# Patient Record
Sex: FEMALE | Race: WHITE | NOT HISPANIC OR LATINO | Employment: OTHER | URBAN - METROPOLITAN AREA
[De-identification: names, ages, dates, MRNs, and addresses within clinical notes are randomized per-mention and may not be internally consistent; named-entity substitution may affect disease eponyms.]

---

## 2023-08-21 ENCOUNTER — ANESTHESIA EVENT (OUTPATIENT)
Dept: ENDOSCOPY | Facility: HOSPITAL | Age: 78
End: 2023-08-21
Payer: MEDICARE

## 2023-08-21 ENCOUNTER — HOSPITAL ENCOUNTER (OUTPATIENT)
Facility: HOSPITAL | Age: 78
Discharge: HOME | End: 2023-08-21
Attending: INTERNAL MEDICINE | Admitting: INTERNAL MEDICINE
Payer: MEDICARE

## 2023-08-21 ENCOUNTER — ANESTHESIA (OUTPATIENT)
Dept: ENDOSCOPY | Facility: HOSPITAL | Age: 78
End: 2023-08-21
Payer: MEDICARE

## 2023-08-21 VITALS
TEMPERATURE: 98 F | DIASTOLIC BLOOD PRESSURE: 68 MMHG | BODY MASS INDEX: 27.66 KG/M2 | HEART RATE: 53 BPM | RESPIRATION RATE: 17 BRPM | WEIGHT: 166 LBS | SYSTOLIC BLOOD PRESSURE: 152 MMHG | OXYGEN SATURATION: 97 % | HEIGHT: 65 IN

## 2023-08-21 DIAGNOSIS — R13.10 DYSPHAGIA, UNSPECIFIED TYPE: ICD-10-CM

## 2023-08-21 DIAGNOSIS — K22.10 EROSIVE ESOPHAGITIS: ICD-10-CM

## 2023-08-21 DIAGNOSIS — R11.2 NAUSEA AND VOMITING, UNSPECIFIED VOMITING TYPE: ICD-10-CM

## 2023-08-21 PROBLEM — K22.70 BARRETT'S ESOPHAGUS: Status: ACTIVE | Noted: 2023-08-21

## 2023-08-21 PROCEDURE — 88312 SPECIAL STAINS GROUP 1: CPT

## 2023-08-21 PROCEDURE — D9220A PRA ANESTHESIA: Mod: CRNA,,,

## 2023-08-21 PROCEDURE — 37000009 HC ANESTHESIA EA ADD 15 MINS: Performed by: INTERNAL MEDICINE

## 2023-08-21 PROCEDURE — 88313 SPECIAL STAINS GROUP 2: CPT

## 2023-08-21 PROCEDURE — 25000003 PHARM REV CODE 250

## 2023-08-21 PROCEDURE — 37000008 HC ANESTHESIA 1ST 15 MINUTES: Performed by: INTERNAL MEDICINE

## 2023-08-21 PROCEDURE — D9220A PRA ANESTHESIA: ICD-10-PCS | Mod: ANES,,, | Performed by: ANESTHESIOLOGY

## 2023-08-21 PROCEDURE — 25000003 PHARM REV CODE 250: Performed by: INTERNAL MEDICINE

## 2023-08-21 PROCEDURE — 88305 TISSUE EXAM BY PATHOLOGIST: CPT | Performed by: INTERNAL MEDICINE

## 2023-08-21 PROCEDURE — 63600175 PHARM REV CODE 636 W HCPCS

## 2023-08-21 PROCEDURE — D9220A PRA ANESTHESIA: Mod: ANES,,, | Performed by: ANESTHESIOLOGY

## 2023-08-21 PROCEDURE — 27201423 OPTIME MED/SURG SUP & DEVICES STERILE SUPPLY: Performed by: INTERNAL MEDICINE

## 2023-08-21 PROCEDURE — D9220A PRA ANESTHESIA: ICD-10-PCS | Mod: CRNA,,,

## 2023-08-21 PROCEDURE — 43239 EGD BIOPSY SINGLE/MULTIPLE: CPT | Performed by: INTERNAL MEDICINE

## 2023-08-21 RX ORDER — AMLODIPINE BESYLATE 10 MG/1
10 TABLET ORAL DAILY
COMMUNITY

## 2023-08-21 RX ORDER — ATORVASTATIN CALCIUM 20 MG/1
20 TABLET, FILM COATED ORAL DAILY
COMMUNITY

## 2023-08-21 RX ORDER — METOPROLOL SUCCINATE 50 MG/1
50 TABLET, EXTENDED RELEASE ORAL DAILY
COMMUNITY

## 2023-08-21 RX ORDER — CLOPIDOGREL BISULFATE 75 MG/1
75 TABLET ORAL DAILY
COMMUNITY

## 2023-08-21 RX ORDER — LIDOCAINE HYDROCHLORIDE 20 MG/ML
INJECTION INTRAVENOUS
Status: DISCONTINUED | OUTPATIENT
Start: 2023-08-21 | End: 2023-08-21

## 2023-08-21 RX ORDER — SODIUM CHLORIDE, SODIUM GLUCONATE, SODIUM ACETATE, POTASSIUM CHLORIDE AND MAGNESIUM CHLORIDE 30; 37; 368; 526; 502 MG/100ML; MG/100ML; MG/100ML; MG/100ML; MG/100ML
INJECTION, SOLUTION INTRAVENOUS CONTINUOUS
Status: CANCELLED | OUTPATIENT
Start: 2023-08-21 | End: 2023-09-20

## 2023-08-21 RX ORDER — AMOXICILLIN 250 MG
1 CAPSULE ORAL DAILY
COMMUNITY

## 2023-08-21 RX ORDER — PROPOFOL 10 MG/ML
VIAL (ML) INTRAVENOUS
Status: DISCONTINUED | OUTPATIENT
Start: 2023-08-21 | End: 2023-08-21

## 2023-08-21 RX ORDER — ONDANSETRON 2 MG/ML
4 INJECTION INTRAMUSCULAR; INTRAVENOUS ONCE AS NEEDED
Status: CANCELLED | OUTPATIENT
Start: 2023-08-21 | End: 2035-01-17

## 2023-08-21 RX ORDER — OMEPRAZOLE 20 MG/1
20 CAPSULE, DELAYED RELEASE ORAL DAILY
COMMUNITY

## 2023-08-21 RX ORDER — SODIUM CHLORIDE, SODIUM GLUCONATE, SODIUM ACETATE, POTASSIUM CHLORIDE AND MAGNESIUM CHLORIDE 30; 37; 368; 526; 502 MG/100ML; MG/100ML; MG/100ML; MG/100ML; MG/100ML
INJECTION, SOLUTION INTRAVENOUS CONTINUOUS
Status: DISCONTINUED | OUTPATIENT
Start: 2023-08-21 | End: 2023-08-21 | Stop reason: HOSPADM

## 2023-08-21 RX ORDER — LORATADINE 10 MG/1
10 TABLET ORAL DAILY
COMMUNITY

## 2023-08-21 RX ORDER — PROPAFENONE HYDROCHLORIDE 325 MG/1
325 CAPSULE, EXTENDED RELEASE ORAL EVERY 12 HOURS
COMMUNITY

## 2023-08-21 RX ORDER — TERAZOSIN 5 MG/1
5 CAPSULE ORAL NIGHTLY
COMMUNITY

## 2023-08-21 RX ORDER — FERROUS GLUCONATE 324(38)MG
324 TABLET ORAL
COMMUNITY

## 2023-08-21 RX ADMIN — PROPOFOL 20 MG: 10 INJECTION, EMULSION INTRAVENOUS at 12:08

## 2023-08-21 RX ADMIN — LIDOCAINE HYDROCHLORIDE 80 MG: 20 INJECTION INTRAVENOUS at 12:08

## 2023-08-21 RX ADMIN — SODIUM CHLORIDE, SODIUM GLUCONATE, SODIUM ACETATE, POTASSIUM CHLORIDE AND MAGNESIUM CHLORIDE: 526; 502; 368; 37; 30 INJECTION, SOLUTION INTRAVENOUS at 12:08

## 2023-08-21 RX ADMIN — SODIUM CHLORIDE, SODIUM GLUCONATE, SODIUM ACETATE, POTASSIUM CHLORIDE AND MAGNESIUM CHLORIDE: 526; 502; 368; 37; 30 INJECTION, SOLUTION INTRAVENOUS at 11:08

## 2023-08-21 RX ADMIN — PROPOFOL 60 MG: 10 INJECTION, EMULSION INTRAVENOUS at 12:08

## 2023-08-21 NOTE — ANESTHESIA PREPROCEDURE EVALUATION
"                                                                                                             2023  Maryjo Israel is a 78 y.o., female.  Pre-operative evaluation for Procedure(s) (LRB):  1100 EGD (N/A)    Maryjo Israel is a 78 y.o. female     There is no problem list on file for this patient.      Review of patient's allergies indicates:   Allergen Reactions    Lortab [hydrocodone-acetaminophen]     Vytorin 10-10 [ezetimibe-simvastatin]        No current facility-administered medications on file prior to encounter.     Current Outpatient Medications on File Prior to Encounter   Medication Sig Dispense Refill    amLODIPine (NORVASC) 10 MG tablet Take 10 mg by mouth once daily.      atorvastatin (LIPITOR) 20 MG tablet Take 20 mg by mouth once daily.      ferrous gluconate (FERGON) 324 MG tablet Take 324 mg by mouth daily with breakfast.      loratadine (CLARITIN) 10 mg tablet Take 10 mg by mouth once daily.      metoprolol succinate (TOPROL-XL) 50 MG 24 hr tablet Take 50 mg by mouth once daily.      omeprazole (PRILOSEC) 20 MG capsule Take 20 mg by mouth once daily.      propafenone (RYTHMOL SR) 325 MG Cp12 Take 325 mg by mouth every 12 (twelve) hours.      senna-docusate 8.6-50 mg (PERICOLACE) 8.6-50 mg per tablet Take 1 tablet by mouth once daily.      terazosin (HYTRIN) 5 MG capsule Take 5 mg by mouth every evening.      clopidogreL (PLAVIX) 75 mg tablet Take 75 mg by mouth once daily.         Past Surgical History:   Procedure Laterality Date    CARDIAC SURGERY       SECTION      COLON SURGERY      HEMORRHOID SURGERY      HYSTERECTOMY      TONSILLECTOMY         Social History     Socioeconomic History    Marital status:          CBC: No results for input(s): "WBC", "RBC", "HGB", "HCT", "PLT", "MCV", "MCH", "MCHC" in the last 72 hours.      BMP 2023 eGFR sl low=55. Cr=1.03, K=3.5  LFT: WNL.  CMP: No results for input(s): "NA", "K", "CL", "CO2", "BUN", " ""CREATININE", "GLU", "MG", "PHOS", "CALCIUM", "ALBUMIN", "PROT", "ALKPHOS", "ALT", "AST", "BILITOT" in the last 72 hours.    INR  No results for input(s): "PT", "INR", "PROTIME", "APTT" in the last 72 hours.        Diagnostic Studies:  CXR 2/14/2023 : [facility]    EKG :    [facility] 9/29/2022 for severe AS (0.71 cm2) : Patent stent LAD and obtuse marginal branch. EF=55%. Patent mLAD stent and large obtuse marginal branch. Small ramus intermedius artery with diffuse dz.   RCA: Total occlusion at the ostium with left-to-right   collateral filling the PDA      2D Echo 1/27/2023 : N Syst Fxn. EF=55-65%. S/p TAVR 1/26/2023, no leak. No pericard effu. Trace MR/PI. Mild TR.  No PHTN.     Pre-op Assessment    I have reviewed the Patient Summary Reports.     I have reviewed the Nursing Notes. I have reviewed the NPO Status.   I have reviewed the Medications.     Review of Systems  Anesthesia Hx:  No problems with previous Anesthesia  History of prior surgery of interest to airway management or planning: Previous anesthesia: General TAVR 1/26/2023: ; Hysterectomy: ; Tonsillect: ; Colon Surgery:  with general anesthesia.  Airway issues documented on chart review include GETA  Denies Family Hx of Anesthesia complications.   Denies Personal Hx of Anesthesia complications.   Social:  Non-Smoker, No Alcohol Use    Hematology/Oncology:  Hematology Normal        EENT/Dental:EENT/Dental Normal   Cardiovascular:   Valvular problems/Murmurs CAD  CABG/stent  hyperlipidemia 6/17/2021NSTEMI. Successful S/p 7/2021  PTCA and drug-eluting stent placement in proximal LAD lesion   PTCA and drug-eluting stent placement in proximal obtuse marginal branch   Lesion.  [facility] 9/29/2022 for severe AS (0.71 cm2) : Patent stent LAD and obtuse marginal branch. EF=55%. Patent mLAD stent and large obtuse marginal branch. Small ramus intermedius artery with diffuse dz.   RCA: Total occlusion at the ostium with left-to-right   collateral filling the PDA  "     2D Echo 1/27/2023 : N Syst Fxn. EF=55-65%. S/p TAVR 1/26/2023, no leak. No pericard effu. Trace MR/PI. Mild TR.  No PHTN.  Coronary Artery Disease:  hx of myocardial infarction (MI), patient hx of diagnosis, patient problem list, hx of Percutaneous Coronary Intervention (PCI), Coronary Angiography (LHC). S/P Percutaneous Coronary Intervention (PCI)   S/P Drug Eluting Stent (KESHA), obtuse marginal, left anterior descending Hx of Myocardial Infarction, MI was > 1 year ago, NSTEMI  Valvular Heart Disease: Tricuspid Regurgitation (TR), mild     Pulmonary:  Pulmonary Normal    Renal/:   Chronic Renal Disease, CKD    Hepatic/GI:   PUD,    Musculoskeletal:  Musculoskeletal Normal    Neurological:  Neurology Normal Denies TIA.  Denies CVA.    Endocrine:  Endocrine Normal    Dermatological:  Skin Normal    Psych:  Psychiatric Normal           Physical Exam  General: Cooperative, Alert and Oriented    Airway:  Mallampati: I / I  Mouth Opening: Normal  TM Distance: Normal  Tongue: Normal  Neck ROM: Normal ROM    Dental:  Intact  Px denies any loose teeth.  Chest/Lungs:  Normal Respiratory Rate    Heart:  Rate: Normal  Rhythm: Regular Rhythm    Abdomen:  Normal, Soft        Anesthesia Plan  Type of Anesthesia, risks & benefits discussed:    Anesthesia Type: Gen Natural Airway  Intra-op Monitoring Plan: Standard ASA Monitors  Induction:  IV  Informed Consent: Informed consent signed with the Patient and all parties understand the risks and agree with anesthesia plan.  All questions answered.   ASA Score: 3  Day of Surgery Review of History & Physical: H&P Update referred to the surgeon/provider.I have interviewed and examined the patient. I have reviewed the patient's H&P dated:   Anesthesia Plan Notes: TIVA with mask/NC, GA back-up.     Ready For Surgery From Anesthesia Perspective.     .

## 2023-08-21 NOTE — OP NOTE
EGD Report    Referring Physician:  Sue Bains    Date of procedure: 08/21/2023     Surgeon: Barry Barnes    ASA:  3    Medications: Per anesthesia    Indication:  Iron-deficiency anemia, pyrosis, epigastric pain    Procedure: EGD biopsy    Description of the Procedure: The patient was brought back to the endoscopy suite where the risks, benefits, and alternatives of the procedure were described in detail. The patient was given the opportunity to ask questions and then signed informed consent. Patient was positioned in the left lateral decubitus position, continuous monitoring was initiated, and supplemental oxygen was provided via nasal cannula. Bite block was placed. Adequate sedation was achieved with the above mentioned medications as documented in chart and then titrated during the entire procedure. Under direct visualization the gastroscope was introduced through the oropharynx into the esophagus. The scope was advanced into the stomach and to the second portion of the duodenum. Scope was withdrawn and the mucosa was carefully examined. The entire gastric mucosa was examined, including the fundus with retroflexion. Air was evacuated from the stomach and the scope was withdrawn into the esophagus. The entire esophageal mucosa was examined. The procedure was completed. The patient tolerated the procedure well and was transferred to the recovery area in stable condition.     Estimated Blood Loss: minimal    Complications: none    Findings:  Short-segment Clifford's evident, viewed with normal and narrow band imaging, multiple biopsied to rule out dysplasia  Small hiatal hernia  Some antral erythema, biopsy obtained to rule out Helicobacter no active ulceration  Normal duodenal in the 1st and 2nd portions    Impression and Recommendations:   The patient does have a short-segment Clifford's, but had no ulceration evident on today's exam.  She had a small hiatal hernia.  We did obtain antral biopsies to rule out  Helicobacter, and Clifford's biopsies to rule out dysplasia.  She probably ought to have her colon evaluated given the years since her last exam and her personal and family history.  We will arrange such, anticipating that she can hold her Plavix and Eliquis again for those planned procedures    Barry Barnes

## 2023-08-21 NOTE — H&P
History and Physical           HPI:     Patient is a 78 y.o. female known to me from previous evaluation.  She had colonoscopies by other physicians in the past.  Her brother was diagnosed with rectal cancer in his 60s.  I believe I last performed a screening exam on her in , and she did have polyps addressed.      She does have a history of chronic pyrosis with short-segment Clifford's above a moderately sized hiatal hernia.  She is been dilated periodically.  Her last upper scope I believe took place in 2017, short-segment Clifford's, large hiatal hernia, grade C esophagitis with an ulcer at the GE junction.      She had been on a proton pump inhibitor when last seen.      She was apparently hospitalized with a urinary tract infection and found to have anemia.  She does have aortic stenosis and atrial fibrillation she had a TAVR, and during her hospitalization she was found to have fairly significant anemia.  She tells us that she has been troubled of late by progressive pyrosis and epigastric tenderness, though she has found some benefit with over-the-counter omeprazole.      Given that scenario we had proposed an upper exam, and she is today scheduled for that.        PCP:  Sue Bains NP    Review of patient's allergies indicates:   Allergen Reactions    Lortab [hydrocodone-acetaminophen]     Vytorin 10-10 [ezetimibe-simvastatin]         Past Medical History:  Past Medical History:   Diagnosis Date    Dysphagia     Erosive esophagitis     Nausea and vomiting       Past Surgical History:  Past Surgical History:   Procedure Laterality Date    CARDIAC SURGERY       SECTION      COLON SURGERY      HEMORRHOID SURGERY      HYSTERECTOMY      TONSILLECTOMY        Family History:  History reviewed. No pertinent family history.  Social History:  Social History     Tobacco Use    Smoking status: Not on file    Smokeless tobacco: Not on file   Substance Use Topics    Alcohol use: Not on file          Review of Systems:     Review of Systems    Objective:     VITAL SIGNS: 24 HR MIN & MAX LAST    Temp  Min: 97.9 °F (36.6 °C)  Max: 97.9 °F (36.6 °C)  97.9 °F (36.6 °C)        BP  Min: 115/71  Max: 146/70  115/71     Pulse  Min: 54  Max: 56  (!) 56     Resp  Min: 19  Max: 21  19    SpO2  Min: 97 %  Max: 98 %  97 %      Physical Exam  Constitutional:       Appearance: Normal appearance.   HENT:      Head: Atraumatic.      Mouth/Throat:      Mouth: Mucous membranes are moist.   Eyes:      Extraocular Movements: Extraocular movements intact.      Pupils: Pupils are equal, round, and reactive to light.   Cardiovascular:      Rate and Rhythm: Normal rate and regular rhythm.      Heart sounds: Normal heart sounds.   Pulmonary:      Breath sounds: Normal breath sounds.   Abdominal:      Palpations: Abdomen is soft.      Comments: Mild epigastric tenderness   Musculoskeletal:      Right lower leg: No edema.      Left lower leg: No edema.   Skin:     General: Skin is warm and dry.   Neurological:      General: No focal deficit present.      Mental Status: She is alert and oriented to person, place, and time.   Psychiatric:         Mood and Affect: Mood normal.         Behavior: Behavior normal.           No results found for this or any previous visit (from the past 48 hour(s)).    No results found.    @[facility]@    Assessment /Plan:   Complex 78-year-old found to have significant anemia on Plavix, admits to pyrosis and epigastric pain, has a history of short-segment Clifford's with no endoscopy in recent years.  Also a history of adenomatous colon polyps and a family history of colon cancer.  She is today scheduled for upper exam, and we will determine whether she ought to have her lower tract evaluated down the line  There are no problems to display for this patient.       Thank you for allowing us to participate in this patient's care.

## 2023-08-21 NOTE — DISCHARGE SUMMARY
[facility]  Discharge Note  Short Stay    Procedure(s) (LRB):  EGD (N/A)      OUTCOME: Patient tolerated treatment/procedure well without complication and is now ready for discharge.    DISPOSITION: Home or Self Care    FINAL DIAGNOSIS:  <principal problem not specified>    FOLLOWUP: With primary care provider    DISCHARGE INSTRUCTIONS:  No discharge procedures on file.      Clinical Reference Documents Added to Patient Instructions         Document    MODERATE SEDATION IN ADULTS DISCHARGE INSTRUCTIONS (ENGLISH)    UPPER GI ENDOSCOPY DISCHARGE INSTRUCTIONS (ENGLISH)            TIME SPENT ON DISCHARGE: 15 minutes

## 2023-08-21 NOTE — TRANSFER OF CARE
"Anesthesia Transfer of Care Note    Patient: Maryjo Israel    Procedure(s) Performed: Procedure(s) (LRB):  EGD (N/A)    Patient location: GI    Anesthesia Type: general    Transport from OR: Transported from OR on room air with adequate spontaneous ventilation    Post pain: adequate analgesia    Post assessment: no apparent anesthetic complications and tolerated procedure well    Post vital signs: stable    Level of consciousness: awake, alert and oriented    Complications: none    Transfer of care protocol was followed      Last vitals:   Visit Vitals  /71   Pulse (!) 56   Temp 36.6 °C (97.9 °F) (Skin)   Resp 19   Ht 5' 5" (1.651 m)   Wt 75.3 kg (166 lb)   SpO2 97%   BMI 27.62 kg/m²     "

## 2023-08-21 NOTE — ANESTHESIA POSTPROCEDURE EVALUATION
Anesthesia Post Evaluation    Patient: Maryjo Israel    Procedure(s) Performed: Procedure(s) (LRB):  EGD (N/A)    Final Anesthesia Type: general (-TIVA w Shira AW)      Patient location during evaluation: GI PACU  Patient participation: Yes- Able to Participate  Level of consciousness: awake and alert, awake and oriented  Post-procedure vital signs: reviewed and stable  Pain management: adequate  Airway patency: patent    PONV status at discharge: No PONV  Anesthetic complications: no      Cardiovascular status: blood pressure returned to baseline, hemodynamically stable and stable  Respiratory status: unassisted, spontaneous ventilation and room air  Hydration status: euvolemic  Follow-up not needed.          Vitals Value Taken Time   /68 08/21/23 1312   Temp 36.9 °C (98.4 °F) 08/21/23 1249   Pulse 53 08/21/23 1312   Resp 17 08/21/23 1312   SpO2 97 % 08/21/23 1312         No case tracking events are documented in the log.      Pain/Sunshine Score: Sunshine Score: 10 (8/21/2023  1:12 PM)

## 2023-08-22 LAB — PSYCHE PATHOLOGY RESULT: NORMAL

## 2023-10-23 ENCOUNTER — ANESTHESIA EVENT (OUTPATIENT)
Dept: ENDOSCOPY | Facility: HOSPITAL | Age: 78
End: 2023-10-23
Payer: MEDICARE

## 2023-10-24 ENCOUNTER — HOSPITAL ENCOUNTER (OUTPATIENT)
Facility: HOSPITAL | Age: 78
Discharge: HOME | End: 2023-10-24
Attending: INTERNAL MEDICINE | Admitting: INTERNAL MEDICINE
Payer: MEDICARE

## 2023-10-24 ENCOUNTER — ANESTHESIA (OUTPATIENT)
Dept: ENDOSCOPY | Facility: HOSPITAL | Age: 78
End: 2023-10-24
Payer: MEDICARE

## 2023-10-24 DIAGNOSIS — Z86.010 PERSONAL HISTORY OF COLONIC POLYPS: ICD-10-CM

## 2023-10-24 DIAGNOSIS — K63.5 POLYP OF HEPATIC FLEXURE OF COLON: Primary | ICD-10-CM

## 2023-10-24 PROBLEM — Z86.0100 PERSONAL HISTORY OF COLONIC POLYPS: Status: ACTIVE | Noted: 2023-10-24

## 2023-10-24 PROCEDURE — 37000009 HC ANESTHESIA EA ADD 15 MINS: Performed by: INTERNAL MEDICINE

## 2023-10-24 PROCEDURE — D9220A PRA ANESTHESIA: ICD-10-PCS | Mod: ANES,,, | Performed by: ANESTHESIOLOGY

## 2023-10-24 PROCEDURE — 45380 COLONOSCOPY AND BIOPSY: CPT | Mod: PT | Performed by: INTERNAL MEDICINE

## 2023-10-24 PROCEDURE — 27201423 OPTIME MED/SURG SUP & DEVICES STERILE SUPPLY: Performed by: INTERNAL MEDICINE

## 2023-10-24 PROCEDURE — 63600175 PHARM REV CODE 636 W HCPCS: Performed by: ANESTHESIOLOGY

## 2023-10-24 PROCEDURE — 88305 TISSUE EXAM BY PATHOLOGIST: CPT | Performed by: INTERNAL MEDICINE

## 2023-10-24 PROCEDURE — D9220A PRA ANESTHESIA: ICD-10-PCS | Mod: CRNA,,, | Performed by: NURSE ANESTHETIST, CERTIFIED REGISTERED

## 2023-10-24 PROCEDURE — 37000008 HC ANESTHESIA 1ST 15 MINUTES: Performed by: INTERNAL MEDICINE

## 2023-10-24 PROCEDURE — D9220A PRA ANESTHESIA: Mod: CRNA,,, | Performed by: NURSE ANESTHETIST, CERTIFIED REGISTERED

## 2023-10-24 PROCEDURE — 25000003 PHARM REV CODE 250: Performed by: ANESTHESIOLOGY

## 2023-10-24 PROCEDURE — D9220A PRA ANESTHESIA: Mod: ANES,,, | Performed by: ANESTHESIOLOGY

## 2023-10-24 RX ORDER — PROPOFOL 10 MG/ML
VIAL (ML) INTRAVENOUS
Status: DISCONTINUED | OUTPATIENT
Start: 2023-10-24 | End: 2023-10-24

## 2023-10-24 RX ORDER — PROPOFOL 10 MG/ML
VIAL (ML) INTRAVENOUS
Status: COMPLETED
Start: 2023-10-24 | End: 2023-10-24

## 2023-10-24 RX ADMIN — SODIUM CHLORIDE, SODIUM GLUCONATE, SODIUM ACETATE, POTASSIUM CHLORIDE AND MAGNESIUM CHLORIDE: 526; 502; 368; 37; 30 INJECTION, SOLUTION INTRAVENOUS at 08:10

## 2023-10-24 RX ADMIN — PROPOFOL 70 MG: 10 INJECTION, EMULSION INTRAVENOUS at 08:10

## 2023-10-24 RX ADMIN — PROPOFOL 30 MG: 10 INJECTION, EMULSION INTRAVENOUS at 08:10

## 2023-10-24 RX ADMIN — PROPOFOL 30 MG: 10 INJECTION, EMULSION INTRAVENOUS at 09:10

## 2023-10-24 NOTE — OP NOTE
Colonoscopy Procedure Note    Date of procedure: 10/24/2023     Surgeon: Barry Barnes    Referring MD:Sue Bains NP      Procedure: Colonoscopy    Indications:  Iron-deficiency anemia, family history of rectal cancer       Post op Diagnosis:  Fair prep, successful examination to the terminal ileum.  Single diminutive polyp in the region of the hepatic flexure addressed with the biopsy forceps.  Reasonable mucosal visualization throughout         Sedation: Per anesthesia:       Procedure Details: The patient was brought to the endoscopy suite after the risks, benefits, and alternatives of the procedure were described and the patient was given the opportunity to ask questions and signed informed consent. Patient was positioned in the left lateral decubitus position, continuous monitoring was initiated, and supplemental oxygen was provided via nasal cannula. Adequate sedation was achieved with the medications documented in chart and titrated during the procedure. A digital rectal exam was performed. Under direct visualization the colonoscope was introduced into the rectum and advanced to the cecum. The ileocecal valve and appendiceal orifice were identified. The terminal ileum was intubated. The scope was withdrawn, and the mucosa was examined in a circular fashion. Retroflexion was done in the rectum. Air was evacuated from the colon and the procedure was completed. The patient tolerated the procedure well and was transferred to the recovery area in stable condition.     Findings:  The patient had fair preparation, and retained liquid stool prompted tedious washing and suctioning.  We saw through to the cecum and into a normal terminal ileum.  Mucosal visualization was adequate.  One single diminutive polyp in the region of the hepatic flexure measuring 3 or 4 mm with a addressed with a cold forceps.    Impression and Recommendations:   Successful endoscopy to the terminal ileum.  Diminutive polyp addressed.  Fair  prep, but reasonable visualization.  We will reassure her and leave well enough alone.  Given her years this will serve as her last routine exam       Barry Barnes MD

## 2023-10-24 NOTE — ANESTHESIA POSTPROCEDURE EVALUATION
Anesthesia Post Evaluation    Patient: Maryjo Israel    Procedure(s) Performed: Procedure(s) (LRB):  COLON (N/A)    Final Anesthesia Type: general      Patient location during evaluation: PACU  Patient participation: Yes- Able to Participate  Level of consciousness: awake and alert  Post-procedure vital signs: reviewed and stable  Pain management: adequate  Airway patency: patent    PONV status at discharge: No PONV  Anesthetic complications: no      Cardiovascular status: hemodynamically stable  Respiratory status: unassisted  Hydration status: euvolemic  Follow-up not needed.          Vitals Value Taken Time   /66 10/24/23 0928   Temp ** 10/24/23 1008   Pulse 68 10/24/23 0928   Resp 18 10/24/23 0928   SpO2 97 % 10/24/23 0928         No case tracking events are documented in the log.      Pain/Sunshine Score: Sunshine Score: 10 (10/24/2023  9:28 AM)

## 2023-10-24 NOTE — H&P
History and Physical           HPI:     Patient is a 78 y.o. female known to me from previous evaluation.  She had colonoscopies by other physicians in the past, her last in 2016 with polyps addressed.  Her brother was diagnosed with rectal cancer in his 60s.      She has a history of short-segment Clifford's his need for periodic dilation.  She was found to have anemia upon hospitalization for a urinary tract infection earlier in the year.  She did have aortic stenosis and atrial fibrillation and that prompted a TAVR.      She did undergo upper endoscopy in 2023, and had that short-segment Clifford's with reassuring biopsies.  She had a small hiatal hernia and an otherwise benign exam to the 2nd portion.    She has done well in the interim, and tells me that her blood count responded to iron therapy.  Given the years since her last colonoscopy and her ominous family history, colonoscopy seemed prudent for reassurance.  She is today scheduled for that exam.    PCP:  Sue Bains NP    Review of patient's allergies indicates:   Allergen Reactions    Lortab [hydrocodone-acetaminophen]     Vytorin 10-10 [ezetimibe-simvastatin]         Past Medical History:  Past Medical History:   Diagnosis Date    Dysphagia     Erosive esophagitis     Nausea and vomiting       Past Surgical History:  Past Surgical History:   Procedure Laterality Date    CARDIAC SURGERY       SECTION      COLON SURGERY      EGD, WITH CLOSED BIOPSY N/A 2023    Procedure: EGD;  Surgeon: Barry Barnes MD;  Location: [facility] ENDOSCOPY;  Service: Gastroenterology;  Laterality: N/A;    HEMORRHOID SURGERY      HYSTERECTOMY      TONSILLECTOMY        Family History:  History reviewed. No pertinent family history.  Social History:  Social History     Tobacco Use    Smoking status: Never    Smokeless tobacco: Never   Substance Use Topics    Alcohol use: Not on file         Review of Systems:     Review of Systems    Objective:      VITAL SIGNS: 24 HR MIN & MAX LAST    Temp  Min: 97.7 °F (36.5 °C)  Max: 97.7 °F (36.5 °C)  97.7 °F (36.5 °C)        BP  Min: 162/73  Max: 162/73  (!) 162/73     Pulse  Min: 66  Max: 66  66     Resp  Min: 16  Max: 16  16    SpO2  Min: 98 %  Max: 98 %  98 % (room air)      Physical Exam  Constitutional:       Appearance: Normal appearance.   HENT:      Head: Atraumatic.      Mouth/Throat:      Mouth: Mucous membranes are moist.   Eyes:      Pupils: Pupils are equal, round, and reactive to light.   Cardiovascular:      Rate and Rhythm: Normal rate. Rhythm irregular.      Heart sounds: Normal heart sounds.   Pulmonary:      Effort: Pulmonary effort is normal.      Breath sounds: Normal breath sounds.   Abdominal:      Palpations: Abdomen is soft.      Tenderness: There is no abdominal tenderness.   Musculoskeletal:      Right lower leg: No edema.      Left lower leg: No edema.   Neurological:      General: No focal deficit present.      Mental Status: She is alert and oriented to person, place, and time.   Psychiatric:         Mood and Affect: Mood normal.         Behavior: Behavior normal.           No results found for this or any previous visit (from the past 48 hour(s)).    No results found.    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

## 2023-10-24 NOTE — DISCHARGE SUMMARY
[facility]  Discharge Note  Short Stay    Procedure(s) (LRB):  COLON (N/A)      OUTCOME: Patient tolerated treatment/procedure well without complication and is now ready for discharge.    DISPOSITION: Home or Self Care    FINAL DIAGNOSIS:  <principal problem not specified>    FOLLOWUP: With primary care provider    DISCHARGE INSTRUCTIONS:    Discharge Procedure Orders   Diet general        TIME SPENT ON DISCHARGE: 15 minutes

## 2023-10-24 NOTE — TRANSFER OF CARE
"Anesthesia Transfer of Care Note    Patient: Maryjo Israel    Procedure(s) Performed: Procedure(s) (LRB):  COLON (N/A)    Patient location: GI    Anesthesia Type: general    Transport from OR: Transported from OR on room air with adequate spontaneous ventilation    Post pain: adequate analgesia    Post assessment: no apparent anesthetic complications and tolerated procedure well    Post vital signs: stable    Level of consciousness: awake and alert    Nausea/Vomiting: no nausea/vomiting    Complications: none          Last vitals:   Visit Vitals  BP (!) 162/73 (BP Location: Right arm, Patient Position: Sitting)   Pulse 66   Temp 36.5 °C (97.7 °F) (Tympanic)   Resp 16   Ht 5' 3" (1.6 m)   Wt 76.7 kg (169 lb)   SpO2 98%   Breastfeeding No   BMI 29.94 kg/m²     "

## 2023-10-24 NOTE — ANESTHESIA PREPROCEDURE EVALUATION
10/24/2023  Maryjo Israel is a 78 y.o., female with ----------------------------  Dysphagia  Erosive esophagitis  Nausea and vomiting    And ----------------------------  Cardiac surgery   section  Colon surgery  Egd, with closed biopsy      Comment:  Procedure: EGD;  Surgeon: Barry Barnes MD;                 Location: [facility] ENDOSCOPY;  Service:                Gastroenterology;  Laterality: N/A;  Hemorrhoid surgery  Hysterectomy  Tonsillectomy    Presents for colonoscopy routine with hx of polyps       Pre-op Assessment    I have reviewed the NPO Status.      Review of Systems      Physical Exam  General: Well nourished, Cooperative, Alert and Oriented  spry  Airway:  Mallampati: II   Mouth Opening: Normal  TM Distance: Normal  Tongue: Normal  Neck ROM: Normal ROM    Dental:  Intact    Chest/Lungs:  Clear to auscultation, Normal Respiratory Rate    Heart:  Rate: Normal  Rhythm: Regular Rhythm       Latest Reference Range & Units 23 05:07   Sodium 136 - 145 mmol/L 138 (E)   Potassium 3.5 - 5.1 mmol/L 3.5 (E)   Chloride 100 - 109 mmol/L 104 (E)   CO2 22 - 33 mmol/L 26 (E)   Anion Gap 8 - 16 mmol/L 8 (E)   BUN 5 - 25 mg/dL 10 (E)   Creatinine 0.57 - 1.25 mg/dL 1.03 (E)   eGFR if African American mL/min/1.73mSq 55 (E)   Calcium 8.8 - 10.6 mg/dL 8.0 (L) (E)   (L): Data is abnormally low  (E): External lab result    Anesthesia Plan  Type of Anesthesia, risks & benefits discussed:    Anesthesia Type: Gen Natural Airway  Intra-op Monitoring Plan: Standard ASA Monitors  Post Op Pain Control Plan: IV/PO Opioids PRN  Induction:  IV  Airway Plan: Direct  Informed Consent: Informed consent signed with the Patient and all parties understand the risks and agree with anesthesia plan.  All questions answered. Patient consented to blood products? No  ASA Score: 3  Day of Surgery Review of History &  Physical: H&P Update referred to the surgeon/provider.  Anesthesia Plan Notes: Nasal cannula vs facemask supplemental oxygenation   For patients with OPAL/obesity, may consider SuperNoval Nasal CPAP      Ready For Surgery From Anesthesia Perspective.     .

## 2023-10-25 VITALS
DIASTOLIC BLOOD PRESSURE: 66 MMHG | RESPIRATION RATE: 18 BRPM | BODY MASS INDEX: 29.95 KG/M2 | SYSTOLIC BLOOD PRESSURE: 147 MMHG | TEMPERATURE: 98 F | HEART RATE: 68 BPM | OXYGEN SATURATION: 97 % | WEIGHT: 169 LBS | HEIGHT: 63 IN

## 2023-10-25 LAB — PSYCHE PATHOLOGY RESULT: NORMAL

## 2024-12-17 DIAGNOSIS — Z78.0 MENOPAUSE: Primary | ICD-10-CM

## 2024-12-23 ENCOUNTER — HOSPITAL ENCOUNTER (OUTPATIENT)
Dept: RADIOLOGY | Facility: HOSPITAL | Age: 79
Discharge: HOME | End: 2024-12-23
Payer: MEDICARE

## 2024-12-23 DIAGNOSIS — Z78.0 MENOPAUSE: ICD-10-CM

## 2024-12-23 PROCEDURE — 77080 DXA BONE DENSITY AXIAL: CPT | Mod: TC

## (undated) DEVICE — BAG LABGUARD BIOHAZARD 6X9IN

## (undated) DEVICE — TIP SUCTION YANKAUER

## (undated) DEVICE — FORCEP BX LG CAP 2.8MMX240CM

## (undated) DEVICE — COLLECTION SPECIMEN NEPTUNE

## (undated) DEVICE — KIT SURGICAL COLON .25 1.1OZ

## (undated) DEVICE — TUBING O2 FEMALE CONN 13FT

## (undated) DEVICE — SOL IRRI STRL WATER 1000ML

## (undated) DEVICE — KIT CANIST SUCTION 1200CC

## (undated) DEVICE — ADAPTER DUAL NSL LUER M-M 7FT

## (undated) DEVICE — BITE BLOCK ADULT

## (undated) DEVICE — BLOCK BLOX BITE DENT RIM 54FR

## (undated) DEVICE — UNDERPAD DISPOSABLE 30X30IN

## (undated) DEVICE — CONTAINER SPECIMEN SCREW 4OZ

## (undated) DEVICE — CONTAINER SPEC STRL PATH 4OZ